# Patient Record
Sex: FEMALE | Race: WHITE
[De-identification: names, ages, dates, MRNs, and addresses within clinical notes are randomized per-mention and may not be internally consistent; named-entity substitution may affect disease eponyms.]

---

## 2019-01-10 PROBLEM — Z00.00 ENCOUNTER FOR PREVENTIVE HEALTH EXAMINATION: Status: ACTIVE | Noted: 2019-01-10

## 2019-01-11 ENCOUNTER — APPOINTMENT (OUTPATIENT)
Dept: BARIATRICS | Facility: CLINIC | Age: 54
End: 2019-01-11
Payer: COMMERCIAL

## 2019-01-11 VITALS
SYSTOLIC BLOOD PRESSURE: 127 MMHG | HEIGHT: 66 IN | HEART RATE: 90 BPM | DIASTOLIC BLOOD PRESSURE: 83 MMHG | WEIGHT: 193.6 LBS | BODY MASS INDEX: 31.12 KG/M2

## 2019-01-11 DIAGNOSIS — Z98.84 BARIATRIC SURGERY STATUS: ICD-10-CM

## 2019-01-11 DIAGNOSIS — Z83.3 FAMILY HISTORY OF DIABETES MELLITUS: ICD-10-CM

## 2019-01-11 DIAGNOSIS — Z80.9 FAMILY HISTORY OF MALIGNANT NEOPLASM, UNSPECIFIED: ICD-10-CM

## 2019-01-11 DIAGNOSIS — R10.12 LEFT UPPER QUADRANT PAIN: ICD-10-CM

## 2019-01-11 DIAGNOSIS — Z87.891 PERSONAL HISTORY OF NICOTINE DEPENDENCE: ICD-10-CM

## 2019-01-11 PROCEDURE — 99203 OFFICE O/P NEW LOW 30 MIN: CPT

## 2019-01-11 NOTE — HISTORY OF PRESENT ILLNESS
[de-identified] : 53 year old female who had rygb many years ago in [place] and then had internal hernia that we handled by laparoscopy.  Has done well until a week ago, we last operated 8 years ago, when had severe left upper quadrant pain and went to [place] [facility].  Had ct scan done which I reviewed and see the unfilled cystic structure that is not pouch or remnant and no evidence of bowel obstruction or swirl of mesentary.  She did have kidney stone which is non obstructive\par since has done better and has much less pain\par

## 2019-01-11 NOTE — ASSESSMENT
[FreeTextEntry1] : 1) as is improving and tolerating diet would keep on pureed foods give suppository \par 2) explained that if pain increasese requires laparoscopy to rule out repeat internal hernia\par 3) will get egd to better understand cystic structure but believe this was present since surgery and unrelated to present symptoms\par 4) get post op blood work\par \par all above explained over 30 minutes

## 2019-01-11 NOTE — PHYSICAL EXAM
[Obese] : obese [Normal] : normal spine exam without palpable tenderness, no kyphosis or scoliosis [de-identified] : midline incision from tummy tuck benign no peritoneal signs

## 2019-07-26 ENCOUNTER — APPOINTMENT (OUTPATIENT)
Dept: BARIATRICS | Facility: CLINIC | Age: 54
End: 2019-07-26
Payer: COMMERCIAL

## 2019-07-26 VITALS
WEIGHT: 197.2 LBS | BODY MASS INDEX: 31.83 KG/M2 | HEART RATE: 86 BPM | DIASTOLIC BLOOD PRESSURE: 74 MMHG | SYSTOLIC BLOOD PRESSURE: 106 MMHG

## 2019-07-26 DIAGNOSIS — E66.01 MORBID (SEVERE) OBESITY DUE TO EXCESS CALORIES: ICD-10-CM

## 2019-07-26 PROCEDURE — 99214 OFFICE O/P EST MOD 30 MIN: CPT

## 2019-07-26 NOTE — HISTORY OF PRESENT ILLNESS
[de-identified] : 54 year old female who had lap rygb at  [place] 11 years ago. had internal hernia which we handelded and following this, had a abdominoplasty y. since that time, has gained 60 pounds and complained intermittent left upper quandrant pain. she went to [facility] in january and saw us following this and there was cystic structure that we wantedto decipher. Unfortunaltely, she defered endoscopy. She returns today and has no emesis, mild further weight gain, moves bowels and no obstructive sx. it is my impression that she has a functional biowel disorder that has been excacerbated by a tight abdominal plasty and weight regain that is increasing intraabdominal pressure. laparoscopy woud have limited access, suggest she has an EGD and begin medical weight loss and referred to [facility]. See no indication for operative intervention

## 2019-07-26 NOTE — HISTORY OF PRESENT ILLNESS
[de-identified] : 54 year old female who had lap rygb at  [place] 11 years ago. had internal hernia which we handelded and following this, had a abdominoplasty y. since that time, has gained 60 pounds and complained intermittent left upper quandrant pain. she went to [facility] in january and saw us following this and there was cystic structure that we wantedto decipher. Unfortunaltely, she defered endoscopy. She returns today and has no emesis, mild further weight gain, moves bowels and no obstructive sx. it is my impression that she has a functional biowel disorder that has been excacerbated by a tight abdominal plasty and weight regain that is increasing intraabdominal pressure. laparoscopy woud have limited access, suggest she has an EGD and begin medical weight loss and referred to [facility]. See no indication for operative intervention

## 2020-05-14 PROBLEM — R10.12 LEFT UPPER QUADRANT PAIN: Status: ACTIVE | Noted: 2019-01-11

## 2024-05-15 ENCOUNTER — NON-APPOINTMENT (OUTPATIENT)
Age: 59
End: 2024-05-15

## 2024-10-14 ENCOUNTER — NON-APPOINTMENT (OUTPATIENT)
Age: 59
End: 2024-10-14

## 2024-11-23 ENCOUNTER — NON-APPOINTMENT (OUTPATIENT)
Age: 59
End: 2024-11-23

## 2025-01-23 ENCOUNTER — NON-APPOINTMENT (OUTPATIENT)
Age: 60
End: 2025-01-23

## 2025-03-07 ENCOUNTER — NON-APPOINTMENT (OUTPATIENT)
Age: 60
End: 2025-03-07